# Patient Record
Sex: MALE | Race: WHITE | NOT HISPANIC OR LATINO | URBAN - METROPOLITAN AREA
[De-identification: names, ages, dates, MRNs, and addresses within clinical notes are randomized per-mention and may not be internally consistent; named-entity substitution may affect disease eponyms.]

---

## 2023-08-18 ENCOUNTER — TRANSFERRED RECORDS (OUTPATIENT)
Dept: HEALTH INFORMATION MANAGEMENT | Facility: CLINIC | Age: 73
End: 2023-08-18

## 2023-10-16 ENCOUNTER — TRANSFERRED RECORDS (OUTPATIENT)
Dept: HEALTH INFORMATION MANAGEMENT | Facility: CLINIC | Age: 73
End: 2023-10-16

## 2023-12-08 ENCOUNTER — MEDICAL CORRESPONDENCE (OUTPATIENT)
Dept: HEALTH INFORMATION MANAGEMENT | Facility: CLINIC | Age: 73
End: 2023-12-08

## 2023-12-15 ENCOUNTER — MEDICAL CORRESPONDENCE (OUTPATIENT)
Dept: HEALTH INFORMATION MANAGEMENT | Facility: CLINIC | Age: 73
End: 2023-12-15

## 2023-12-15 ENCOUNTER — REFERRAL (OUTPATIENT)
Dept: TRANSPLANT | Facility: CLINIC | Age: 73
End: 2023-12-15

## 2023-12-15 VITALS — HEIGHT: 72 IN | BODY MASS INDEX: 23.7 KG/M2 | WEIGHT: 175 LBS

## 2023-12-15 DIAGNOSIS — N18.6 END STAGE RENAL DISEASE (H): ICD-10-CM

## 2023-12-15 DIAGNOSIS — Z99.2 ESRD (END STAGE RENAL DISEASE) ON DIALYSIS (H): Primary | ICD-10-CM

## 2023-12-15 DIAGNOSIS — E78.5 HYPERLIPIDEMIA: ICD-10-CM

## 2023-12-15 DIAGNOSIS — Z85.828 HISTORY OF SKIN CANCER: ICD-10-CM

## 2023-12-15 DIAGNOSIS — Z87.891 HISTORY OF TOBACCO USE: ICD-10-CM

## 2023-12-15 DIAGNOSIS — Z76.82 ORGAN TRANSPLANT CANDIDATE: ICD-10-CM

## 2023-12-15 DIAGNOSIS — I10 ESSENTIAL HYPERTENSION: ICD-10-CM

## 2023-12-15 DIAGNOSIS — I25.10 CARDIOVASCULAR DISEASE: ICD-10-CM

## 2023-12-15 DIAGNOSIS — N18.6 ESRD (END STAGE RENAL DISEASE) ON DIALYSIS (H): Primary | ICD-10-CM

## 2023-12-15 DIAGNOSIS — Z99.2 HEMODIALYSIS PATIENT (H): ICD-10-CM

## 2023-12-15 DIAGNOSIS — Z01.818 PRE-TRANSPLANT EVALUATION FOR KIDNEY TRANSPLANT: ICD-10-CM

## 2023-12-15 DIAGNOSIS — E11.9 DIABETES MELLITUS, TYPE 2 (H): ICD-10-CM

## 2023-12-15 PROBLEM — I50.22 CHRONIC HFREF (HEART FAILURE WITH REDUCED EJECTION FRACTION) (H): Status: ACTIVE | Noted: 2023-05-24

## 2023-12-15 PROBLEM — N18.9 HYPERPHOSPHATEMIA DUE TO CHRONIC KIDNEY DISEASE: Status: ACTIVE | Noted: 2023-05-10

## 2023-12-15 PROBLEM — I21.4 NSTEMI (NON-ST ELEVATED MYOCARDIAL INFARCTION) (H): Status: ACTIVE | Noted: 2023-05-14

## 2023-12-15 PROBLEM — N40.1 BPH WITH OBSTRUCTION/LOWER URINARY TRACT SYMPTOMS: Status: ACTIVE | Noted: 2022-03-01

## 2023-12-15 PROBLEM — N13.8 BPH WITH OBSTRUCTION/LOWER URINARY TRACT SYMPTOMS: Status: ACTIVE | Noted: 2022-03-01

## 2023-12-15 PROBLEM — N25.81 HYPERPARATHYROIDISM, SECONDARY RENAL (H): Status: ACTIVE | Noted: 2023-05-10

## 2023-12-15 PROBLEM — Z79.4 TYPE 2 DIABETES MELLITUS WITH STAGE 4 CHRONIC KIDNEY DISEASE, WITH LONG-TERM CURRENT USE OF INSULIN (H): Status: ACTIVE | Noted: 2021-09-14

## 2023-12-15 PROBLEM — E83.39 HYPERPHOSPHATEMIA DUE TO CHRONIC KIDNEY DISEASE: Status: ACTIVE | Noted: 2023-05-10

## 2023-12-15 PROBLEM — N18.4 TYPE 2 DIABETES MELLITUS WITH STAGE 4 CHRONIC KIDNEY DISEASE, WITH LONG-TERM CURRENT USE OF INSULIN (H): Status: ACTIVE | Noted: 2021-09-14

## 2023-12-15 PROBLEM — E11.22 TYPE 2 DIABETES MELLITUS WITH STAGE 4 CHRONIC KIDNEY DISEASE, WITH LONG-TERM CURRENT USE OF INSULIN (H): Status: ACTIVE | Noted: 2021-09-14

## 2023-12-15 PROBLEM — Z95.1 S/P CABG X 4: Status: ACTIVE | Noted: 2023-05-23

## 2023-12-15 NOTE — LETTER
Elvin Brown May  [address] [facility]  [place] [address]                January 2, 2024    Cosme Oconnor,     It was a pleasure to speak with you over the phone today in preparation of your pre-kidney transplant evaluation. I am sending this email to review the pre-transplant information we covered. I will also put this same content in a letter and send it to your My Chart for your convenience.     A  from our Office will send your schedule in your My Chart for your pre-kidney transplant evaluation on 3/13/24     [facility]  [address] [place] [id number]  Please go to the third floor to check in by 7:30 am.      For parking options, please park in the open lot across the street from the front door of our Clinic.  Otherwise, enter the [facility] /arrival plaza from [facility] and attendants can assist you based on your needs.  parking is available for those with limited mobility Monday-Friday from 7:00 am to 5:00 pm. Please bring one to two family members or friends to your appointment day to help listen to the patient education and to help ask questions that are important to you. You can eat and drink normally on this day. There is a coffee shop on street level for you to purchase food at. Also, please take all your prescribed medications as ordered on this day. Upon completion of your appointments, I will compile the outcomes and have your results reviewed at the Transplant Team Selection Committee on 3/20/23. This is a medical review meeting only and so you will not be asked to attend. I will call you within a few days after this meeting to inform you of the outcomes and to assist in making arrangements for completion of your evaluation. I will also send you a summary letter after our telephone conversation.     You will receive an email from our Transplant Office which contains a Receipt of Information consent  and patient educational materials. Please read/ electronically sign the Receipt of Information consent as well as read the educational materials prior to your evaluation appointments on 3/13/24     Please complete your pre kidney transplant education on the transplant education   [email or web address]           Please complete viewing of these videos prior to your evaluation appointments as this will give you a good knowledge base to then speak with the providers.      Additional transplant resources are as follows:  www.unos.org. UNOS, or United Network of Organ Sharing, is the national organization in our country that maintains all of the organ wait lists as well as is responsible for the rules and regulations for organ allocation. I recommend looking at the Transplant Living section as this area has content created for patients.     www.srtr.org SRTR, or the Scientific Registry for Transplant Recipients is a national data base that all Transplant Centers report their success and failure rate to for all organ types twice per year. The results are public knowledge and do provide a good perspective of organ transplant.     If the transplant providers tell you at your appointments that you should start to have live donors register with our Program to initiate their evaluations, please provide this registration website: [email or web address]   The donor will receive a detailed email response back with information and next steps specific to their situation. Donors can also call our Office and ask to speak with a live donor coordinator in the event of questions at [phone].     Please let me know of any questions or concerns,  Laquita Cyr RN  Pre-Kidney/Pancreas Transplant Coordinator  Email: [email or web address]  Direct Phone Number: [phone]

## 2023-12-15 NOTE — LETTER
Elvin Brown May  [address] [place] [id number]          Dear Elvin,    Thank you for your interest in the Transplant Center at [facility]. We look forward to being a part of your care team and assisting you through the transplant process.    As we discussed, your transplant coordinator is Laqutia Cyr (Kidney).  You may call your coordinator at any time with questions or concerns.  Your first scheduled call will be on 1/2/20204 between 1:00pm-3:00pm. If this needs to change, call [phone].    Please complete the following.    Fill out and return the enclosed forms  Authorization for Electronic Communication  Authorization to Discuss Protected Health Information  Authorization for Release of Protected Health Information    Sign up for:  [employer], access to your electronic medical record (see enclosed pamphlet)  [facility], a transplant education website                                                        My Transplant Place     You can use these tools to learn more about your transplant, communicate with your care team, and track your medical details      Sincerely,      Solid Organ Transplant  [facility]    cc: Referring Physician Dialysis Unit PCP

## 2023-12-15 NOTE — TELEPHONE ENCOUNTER
PCP: Clarisse Fink   Referring Organization: [facility] [employer]  Referring Provider: Dr. 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 No  Is patient diabetic? Yes  Is patient on insulin? Yes  Was patient offered a pancreas transplant referral? No    Is patient in a group home/assisted living? No  Does patient have a guardian? No     Referral intake process completed.  Patient is aware that after financial approval is received, medical records will be requested.   Patient confirmed for a callback from transplant coordinator on 1/2/24. (within 2 weeks)  Tentative evaluation date 3/13/24 slot 1 (within 4 weeks) if appointment is virtual, does patient have capabilities of setting this up? no    Confirmed coordinator will discuss evaluation process in more detail at the time of their call.   Patient is aware of the need to arrange age appropriate cancer screening, vaccinations, and dental care.  Reminded patient to complete questionnaire, complete medical records release, and review packet prior to evaluation visit .  Assessed patient for special needs (ie-wheelchair, assistance, guardian, and ):  yes   Patient instructed to call [phone] with questions.     Patient gave verbal consent during intake call to obtain medical records and documents outside of [facility]:  Yes

## 2023-12-15 NOTE — LETTER
Elvin Brown May  [address] [facility] [address]                December 18, 2023                                        MEDICAL RECORDS REQUEST       [facility] Kidney transplant team is requesting medical records from Dermatology office for patients referred to the Kidney Transplant Program                [facility]ility[facility]     Records Needed to Process Intake of Patient:     Original History and Physical   Provider progress notes (last 2 reports on file)  Lab Results (most recent on file)        Please fax all paper records to [phone] within 3-5 business days.      Please send all scans/slides to:    [facility]  Solid Organ Transplant Office  [address] [place] [address] [address] [place] [id number]    Please call our office at [phone] if you have any questions or concerns.

## 2024-01-02 NOTE — TELEPHONE ENCOUNTER
"Reviewed pt's chart for pre-kidney transplant evaluation planning. Coordinator first call on 1/2/24. PKE STD on 3/13/24.      services required no. Is pt able to attend virtual education class? 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  Pt is on home PD dialysis,  started on 5/29/23. Pt is a type II diabetic, on 40-45 units of insulin/day. Reports last A1C was in the \"low 8's.\"     Health hx: diabetic nephropathy, bladder wall thickening, diabetic retinopathy, Pancreatitis (1990), dyslipidemia, hx of adenomatous colonic polyps, BPH with obstruction, acute diastolic HF, hyperparathyroidism, anemia.  Heart hx: Had a CABG x4 5/16/23 at [facility] and left atrial appendage ligation. On DAPT therapy for 1 year post surgery per cardiology, also has history of hypertension.  Lung hx: none. Surgical hx: CABG x4 in 5/23, cholecystectomy and pancreatic cyst removal in 1990.  Pt is currently smoker but hopes to be totally done by the end of this week, has a 50 year history on and off of smoking cigarettes and cigars, does not consume alcohol, and no recreational drugs. Does not have current infection, no non-healing wounds, or active cancer. 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 (See path report in media tab)    Health maintenance items: BMI 24.  Colonoscopy done 4/23 and recommended 5 year follow up.  Dental: due, needs a tooth pulled.  Vaccinations, currently updating hep B series. Pt is independent w/ ADLs, is limited to walking no more than a block due to leg weakness/pain.  Pt lives in [place], [place]  w/ spouse Anne-Marie.  good support following transplant. Pt does not have living donors.     Imaging Available CT Abdomen Pelvis from 1/2/24. Requested images to pushed to pacs     Contacted patient and introduced myself as their Transplant Coordinator, also introduced the role of the Transplant Coordinator in the transplant process.  Explained the purpose of " this call including reviewing next steps and answering questions.      Confirmed Referring Provider, Dr. Deepak Jacobs; Dialysis Center, [facility] home PD; and Primary Care Physician, Clarisse Fink MD. Explained to the patient of the importance of continued communication with referring providers and primary care physicians.      Reviewed components of transplant evaluation process including necessary appointments, tests, and procedures.  Instructed pt to bring 1-2 people with them to eval and to eat and drink and normally on eval day    Answered questions for patient regarding evaluation, provided my name and contact information and requested they call/message with any additional questions or concerns.  Informed patient they will receive a letter with information discussed in referral call. Determined that patient would like information regarding transplant by:       Mail, Email, and/or Phone Call.  (Patient does not have internet for [employer])     Informed pt about transplant educational website, asked to watch pre-kidney or sign up for education class prior to evaluation. Link for educational videos were provided.  Additional informational web sites about transplant were discussed. Links provided to www.unos.org and www.srtr.org in letter sent to patient.  Pt expressed good understanding of all and were in good agreement with the plan. Patient does not have internet but wife goes to the [employer] to check her email, so will send education packet to her and the dialysis unit. Will schedule education phone call for 3/7/24 at 11am.     Confirmed STD 3/13/24. Informed pt they will hear from scheduling to arrange appointment times for evaluation day. As well as, receive an email from our Office prior to eval with a Receipt of Info and educational materials - instructed to read materials and sign consent prior to eval. Smartset orders entered.

## 2024-03-06 ENCOUNTER — TELEPHONE (OUTPATIENT)
Dept: TRANSPLANT | Facility: CLINIC | Age: 74
End: 2024-03-06
Payer: COMMERCIAL

## 2024-03-06 NOTE — TELEPHONE ENCOUNTER
I/Geetha Friedman RN BSN called Elvin Guerrero and DWAYNE on his land line phone to remind him of the all day evaluation on Wednesday, 3/13/2024.   I gave him the address of [facility] to [place] at 7:30 am and expect to be in clinic all day until 4pm.   I urged Mr. Guerrero to bring a support person with him to listen to the providers in the evaluations.   Pt rcommended to bring money for parking and for lunch.   The full evaluation will be an ALL day event to arrive at 7:30 and CXR, EKG and labs, Echo are from 1-4pm.   I told him masks are not mandatory but to protect himself in the common areas, a Mask is suggustion. Providers may wear masks in the private room in .     Isacc Cyr RN

## 2024-03-12 LAB
ABO/RH(D): ABNORMAL
ANTIBODY SCREEN: POSITIVE
SPECIMEN EXPIRATION DATE: ABNORMAL
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
## 2024-03-13 ENCOUNTER — ALLIED HEALTH/NURSE VISIT (OUTPATIENT)
Dept: TRANSPLANT | Facility: CLINIC | Age: 74
End: 2024-03-13
Attending: NURSE PRACTITIONER
Payer: COMMERCIAL

## 2024-03-13 ENCOUNTER — ANCILLARY PROCEDURE (OUTPATIENT)
Dept: GENERAL RADIOLOGY | Facility: CLINIC | Age: 74
End: 2024-03-13
Attending: NURSE PRACTITIONER
Payer: COMMERCIAL

## 2024-03-13 ENCOUNTER — EVALUATION (OUTPATIENT)
Dept: TRANSPLANT | Facility: CLINIC | Age: 74
End: 2024-03-13

## 2024-03-13 ENCOUNTER — ANCILLARY PROCEDURE (OUTPATIENT)
Dept: CARDIOLOGY | Facility: CLINIC | Age: 74
End: 2024-03-13
Attending: NURSE PRACTITIONER
Payer: COMMERCIAL

## 2024-03-13 ENCOUNTER — LAB (OUTPATIENT)
Dept: LAB | Facility: CLINIC | Age: 74
End: 2024-03-13
Attending: NURSE PRACTITIONER
Payer: COMMERCIAL

## 2024-03-13 VITALS
DIASTOLIC BLOOD PRESSURE: 72 MMHG | HEIGHT: 71 IN | WEIGHT: 192.4 LBS | HEART RATE: 55 BPM | BODY MASS INDEX: 26.94 KG/M2 | OXYGEN SATURATION: 96 % | SYSTOLIC BLOOD PRESSURE: 137 MMHG

## 2024-03-13 DIAGNOSIS — Z99.2 ESRD (END STAGE RENAL DISEASE) ON DIALYSIS (H): ICD-10-CM

## 2024-03-13 DIAGNOSIS — N18.6 ESRD (END STAGE RENAL DISEASE) (H): ICD-10-CM

## 2024-03-13 DIAGNOSIS — Z87.891 HISTORY OF TOBACCO USE: ICD-10-CM

## 2024-03-13 DIAGNOSIS — E78.5 HYPERLIPIDEMIA: ICD-10-CM

## 2024-03-13 DIAGNOSIS — I25.10 CARDIOVASCULAR DISEASE: ICD-10-CM

## 2024-03-13 DIAGNOSIS — N18.6 ESRD (END STAGE RENAL DISEASE) (H): Primary | ICD-10-CM

## 2024-03-13 DIAGNOSIS — Z01.818 PRE-TRANSPLANT EVALUATION FOR KIDNEY TRANSPLANT: ICD-10-CM

## 2024-03-13 DIAGNOSIS — Z99.2 HEMODIALYSIS PATIENT (H): ICD-10-CM

## 2024-03-13 DIAGNOSIS — Z79.4 TYPE 2 DIABETES MELLITUS WITH STAGE 4 CHRONIC KIDNEY DISEASE, WITH LONG-TERM CURRENT USE OF INSULIN (H): ICD-10-CM

## 2024-03-13 DIAGNOSIS — Z76.82 ORGAN TRANSPLANT CANDIDATE: ICD-10-CM

## 2024-03-13 DIAGNOSIS — Z85.828 HISTORY OF SKIN CANCER: ICD-10-CM

## 2024-03-13 DIAGNOSIS — Z95.1 S/P CABG X 4: ICD-10-CM

## 2024-03-13 DIAGNOSIS — E11.9 DIABETES MELLITUS, TYPE 2 (H): ICD-10-CM

## 2024-03-13 DIAGNOSIS — I10 ESSENTIAL HYPERTENSION: ICD-10-CM

## 2024-03-13 DIAGNOSIS — N18.4 TYPE 2 DIABETES MELLITUS WITH STAGE 4 CHRONIC KIDNEY DISEASE, WITH LONG-TERM CURRENT USE OF INSULIN (H): ICD-10-CM

## 2024-03-13 DIAGNOSIS — I21.4 NSTEMI (NON-ST ELEVATED MYOCARDIAL INFARCTION) (H): ICD-10-CM

## 2024-03-13 DIAGNOSIS — Z01.818 PRE-TRANSPLANT EVALUATION FOR KIDNEY TRANSPLANT: Primary | ICD-10-CM

## 2024-03-13 DIAGNOSIS — N18.6 END STAGE RENAL DISEASE (H): ICD-10-CM

## 2024-03-13 DIAGNOSIS — Z99.2 CKD (CHRONIC KIDNEY DISEASE) REQUIRING CHRONIC DIALYSIS (H): ICD-10-CM

## 2024-03-13 DIAGNOSIS — N18.6 CKD (CHRONIC KIDNEY DISEASE) REQUIRING CHRONIC DIALYSIS (H): ICD-10-CM

## 2024-03-13 DIAGNOSIS — N18.6 ESRD (END STAGE RENAL DISEASE) ON DIALYSIS (H): ICD-10-CM

## 2024-03-13 DIAGNOSIS — E11.22 TYPE 2 DIABETES MELLITUS WITH STAGE 4 CHRONIC KIDNEY DISEASE, WITH LONG-TERM CURRENT USE OF INSULIN (H): ICD-10-CM

## 2024-03-13 DIAGNOSIS — Z99.2 HEMODIALYSIS PATIENT (H): Primary | ICD-10-CM

## 2024-03-13 DIAGNOSIS — Z76.82 ORGAN TRANSPLANT CANDIDATE: Primary | ICD-10-CM

## 2024-03-13 DIAGNOSIS — I50.22 CHRONIC HFREF (HEART FAILURE WITH REDUCED EJECTION FRACTION) (H): ICD-10-CM

## 2024-03-13 LAB
A1 AB TITR SERPL: 4 {TITER}
A1 AB TITR SERPL: 4 {TITER}
ABO/RH(D): NORMAL
ALBUMIN SERPL BCG-MCNC: 4 G/DL (ref 3.5–5.2)
ALBUMIN UR-MCNC: 200 MG/DL
ALP SERPL-CCNC: 120 U/L (ref 40–150)
ALT SERPL W P-5'-P-CCNC: 21 U/L (ref 0–70)
ANION GAP SERPL CALCULATED.3IONS-SCNC: 12 MMOL/L (ref 7–15)
ANTIBODY ID: NORMAL
ANTIBODY TITER IGM SCREEN: POSITIVE
APPEARANCE UR: CLEAR
APTT PPP: 29 SECONDS (ref 22–38)
AST SERPL W P-5'-P-CCNC: 21 U/L (ref 0–45)
B IGG TITR SERPL: 64 {TITER}
B IGM TITR SERPL: 4 {TITER}
BASOPHILS # BLD AUTO: 0.1 10E3/UL (ref 0–0.2)
BASOPHILS NFR BLD AUTO: 1 %
BILIRUB SERPL-MCNC: 0.3 MG/DL
BILIRUB UR QL STRIP: NEGATIVE
BUN SERPL-MCNC: 54.8 MG/DL (ref 8–23)
CALCIUM SERPL-MCNC: 9 MG/DL (ref 8.8–10.2)
CHLORIDE SERPL-SCNC: 100 MMOL/L (ref 98–107)
COLOR UR AUTO: ABNORMAL
CREAT SERPL-MCNC: 5.68 MG/DL (ref 0.67–1.17)
DEPRECATED HCO3 PLAS-SCNC: 29 MMOL/L (ref 22–29)
EGFRCR SERPLBLD CKD-EPI 2021: 10 ML/MIN/1.73M2
EOSINOPHIL # BLD AUTO: 0.5 10E3/UL (ref 0–0.7)
EOSINOPHIL NFR BLD AUTO: 7 %
ERYTHROCYTE [DISTWIDTH] IN BLOOD BY AUTOMATED COUNT: 13.1 % (ref 10–15)
FACTOR 2 INTERPRETATION: NORMAL
FACTOR V INTERPRETATION: NORMAL
GLUCOSE SERPL-MCNC: 74 MG/DL (ref 70–99)
GLUCOSE UR STRIP-MCNC: 200 MG/DL
HBV CORE AB SERPL QL IA: NONREACTIVE
HBV SURFACE AB SERPL IA-ACNC: 32.5 M[IU]/ML
HBV SURFACE AB SERPL IA-ACNC: REACTIVE M[IU]/ML
HCT VFR BLD AUTO: 39.1 % (ref 40–53)
HCV AB SERPL QL IA: NONREACTIVE
HGB BLD-MCNC: 12.8 G/DL (ref 13.3–17.7)
HGB UR QL STRIP: NEGATIVE
HIV 1+2 AB+HIV1 P24 AG SERPL QL IA: NONREACTIVE
HYALINE CASTS: 2 /LPF
IMM GRANULOCYTES # BLD: 0 10E3/UL
IMM GRANULOCYTES NFR BLD: 0 %
INR PPP: 1.07 (ref 0.85–1.15)
KETONES UR STRIP-MCNC: NEGATIVE MG/DL
LAB DIRECTOR COMMENTS: NORMAL
LAB DIRECTOR DISCLAIMER: NORMAL
LAB DIRECTOR INTERPRETATION: NORMAL
LAB DIRECTOR METHODOLOGY: NORMAL
LAB DIRECTOR RESULTS: NORMAL
LEUKOCYTE ESTERASE UR QL STRIP: NEGATIVE
LVEF ECHO: NORMAL
LYMPHOCYTES # BLD AUTO: 1.6 10E3/UL (ref 0.8–5.3)
LYMPHOCYTES NFR BLD AUTO: 20 %
MCH RBC QN AUTO: 30.8 PG (ref 26.5–33)
MCHC RBC AUTO-ENTMCNC: 32.7 G/DL (ref 31.5–36.5)
MCV RBC AUTO: 94 FL (ref 78–100)
MONOCYTES # BLD AUTO: 0.8 10E3/UL (ref 0–1.3)
MONOCYTES NFR BLD AUTO: 11 %
MUCOUS THREADS #/AREA URNS LPF: PRESENT /LPF
NEUTROPHILS # BLD AUTO: 4.7 10E3/UL (ref 1.6–8.3)
NEUTROPHILS NFR BLD AUTO: 61 %
NITRATE UR QL: NEGATIVE
NRBC # BLD AUTO: 0 10E3/UL
NRBC BLD AUTO-RTO: 0 /100
PH UR STRIP: 6 [PH] (ref 5–7)
PLATELET # BLD AUTO: 244 10E3/UL (ref 150–450)
POTASSIUM SERPL-SCNC: 4 MMOL/L (ref 3.4–5.3)
PROT SERPL-MCNC: 7.2 G/DL (ref 6.4–8.3)
RBC # BLD AUTO: 4.15 10E6/UL (ref 4.4–5.9)
RBC URINE: 1 /HPF
SODIUM SERPL-SCNC: 141 MMOL/L (ref 135–145)
SP GR UR STRIP: 1.02 (ref 1–1.03)
SPECIMEN DESCRIPTION: NORMAL
SPECIMEN EXPIRATION DATE: NORMAL
SQUAMOUS EPITHELIAL: <1 /HPF
T PALLIDUM AB SER QL: NONREACTIVE
UROBILINOGEN UR STRIP-MCNC: NORMAL MG/DL
WBC # BLD AUTO: 7.7 10E3/UL (ref 4–11)
WBC URINE: 2 /HPF

## 2024-03-13 PROCEDURE — 99205 OFFICE O/P NEW HI 60 MIN: CPT | Performed by: INTERNAL MEDICINE

## 2024-03-13 PROCEDURE — 80053 COMPREHEN METABOLIC PANEL: CPT | Performed by: PATHOLOGY

## 2024-03-13 PROCEDURE — 86832 HLA CLASS I HIGH DEFIN QUAL: CPT | Performed by: INTERNAL MEDICINE

## 2024-03-13 PROCEDURE — 93000 ELECTROCARDIOGRAM COMPLETE: CPT | Performed by: INTERNAL MEDICINE

## 2024-03-13 PROCEDURE — 86886 COOMBS TEST INDIRECT TITER: CPT | Performed by: INTERNAL MEDICINE

## 2024-03-13 PROCEDURE — 85670 THROMBIN TIME PLASMA: CPT | Performed by: INTERNAL MEDICINE

## 2024-03-13 PROCEDURE — 93306 TTE W/DOPPLER COMPLETE: CPT | Performed by: INTERNAL MEDICINE

## 2024-03-13 PROCEDURE — 86665 EPSTEIN-BARR CAPSID VCA: CPT | Performed by: INTERNAL MEDICINE

## 2024-03-13 PROCEDURE — 99000 SPECIMEN HANDLING OFFICE-LAB: CPT | Performed by: PATHOLOGY

## 2024-03-13 PROCEDURE — 86780 TREPONEMA PALLIDUM: CPT | Performed by: INTERNAL MEDICINE

## 2024-03-13 PROCEDURE — 81378 HLA I & II TYPING HR: CPT | Performed by: INTERNAL MEDICINE

## 2024-03-13 PROCEDURE — 87340 HEPATITIS B SURFACE AG IA: CPT | Performed by: INTERNAL MEDICINE

## 2024-03-13 PROCEDURE — 86833 HLA CLASS II HIGH DEFIN QUAL: CPT | Performed by: INTERNAL MEDICINE

## 2024-03-13 PROCEDURE — 85730 THROMBOPLASTIN TIME PARTIAL: CPT | Performed by: PATHOLOGY

## 2024-03-13 PROCEDURE — 85610 PROTHROMBIN TIME: CPT | Performed by: PATHOLOGY

## 2024-03-13 PROCEDURE — 81241 F5 GENE: CPT | Performed by: INTERNAL MEDICINE

## 2024-03-13 PROCEDURE — 99204 OFFICE O/P NEW MOD 45 MIN: CPT | Performed by: SURGERY

## 2024-03-13 PROCEDURE — 86870 RBC ANTIBODY IDENTIFICATION: CPT | Performed by: INTERNAL MEDICINE

## 2024-03-13 PROCEDURE — 86787 VARICELLA-ZOSTER ANTIBODY: CPT | Performed by: INTERNAL MEDICINE

## 2024-03-13 PROCEDURE — 86850 RBC ANTIBODY SCREEN: CPT | Performed by: INTERNAL MEDICINE

## 2024-03-13 PROCEDURE — 71046 X-RAY EXAM CHEST 2 VIEWS: CPT | Performed by: RADIOLOGY

## 2024-03-13 PROCEDURE — 86481 TB AG RESPONSE T-CELL SUSP: CPT | Performed by: INTERNAL MEDICINE

## 2024-03-13 PROCEDURE — 86644 CMV ANTIBODY: CPT | Performed by: INTERNAL MEDICINE

## 2024-03-13 PROCEDURE — 85390 FIBRINOLYSINS SCREEN I&R: CPT | Mod: 26 | Performed by: PATHOLOGY

## 2024-03-13 PROCEDURE — 36415 COLL VENOUS BLD VENIPUNCTURE: CPT | Performed by: PATHOLOGY

## 2024-03-13 PROCEDURE — 81001 URINALYSIS AUTO W/SCOPE: CPT | Performed by: PATHOLOGY

## 2024-03-13 PROCEDURE — G0452 MOLECULAR PATHOLOGY INTERPR: HCPCS | Mod: 26 | Performed by: STUDENT IN AN ORGANIZED HEALTH CARE EDUCATION/TRAINING PROGRAM

## 2024-03-13 PROCEDURE — G0463 HOSPITAL OUTPT CLINIC VISIT: HCPCS | Performed by: SURGERY

## 2024-03-13 PROCEDURE — 85730 THROMBOPLASTIN TIME PARTIAL: CPT | Performed by: INTERNAL MEDICINE

## 2024-03-13 PROCEDURE — 86905 BLOOD TYPING RBC ANTIGENS: CPT | Performed by: INTERNAL MEDICINE

## 2024-03-13 PROCEDURE — 86147 CARDIOLIPIN ANTIBODY EA IG: CPT | Performed by: INTERNAL MEDICINE

## 2024-03-13 PROCEDURE — 86803 HEPATITIS C AB TEST: CPT | Performed by: INTERNAL MEDICINE

## 2024-03-13 PROCEDURE — 86900 BLOOD TYPING SEROLOGIC ABO: CPT | Performed by: INTERNAL MEDICINE

## 2024-03-13 PROCEDURE — 86704 HEP B CORE ANTIBODY TOTAL: CPT | Performed by: INTERNAL MEDICINE

## 2024-03-13 PROCEDURE — 99207 PR NO CHARGE COORDINATED CARE PS: CPT

## 2024-03-13 PROCEDURE — 85025 COMPLETE CBC W/AUTO DIFF WBC: CPT | Performed by: PATHOLOGY

## 2024-03-13 PROCEDURE — 86706 HEP B SURFACE ANTIBODY: CPT | Performed by: INTERNAL MEDICINE

## 2024-03-13 RX ORDER — CLOPIDOGREL BISULFATE 75 MG/1
75 TABLET ORAL DAILY
COMMUNITY
Start: 2023-05-28

## 2024-03-13 RX ORDER — ATORVASTATIN CALCIUM 80 MG/1
80 TABLET, FILM COATED ORAL
COMMUNITY
Start: 2023-05-28

## 2024-03-13 RX ORDER — NICOTINE POLACRILEX 4 MG
15 LOZENGE BUCCAL
COMMUNITY
Start: 2023-12-18

## 2024-03-13 RX ORDER — ASCORBIC ACID 100 MG
1 TABLET,CHEWABLE ORAL EVERY MORNING
COMMUNITY
Start: 2023-05-28

## 2024-03-13 RX ORDER — FUROSEMIDE 80 MG
80 TABLET ORAL DAILY
COMMUNITY
Start: 2023-05-28

## 2024-03-13 RX ORDER — LISINOPRIL 30 MG/1
30 TABLET ORAL AT BEDTIME
COMMUNITY
Start: 2024-01-30

## 2024-03-13 RX ORDER — GENTAMICIN SULFATE 1 MG/G
1 CREAM TOPICAL
COMMUNITY
Start: 2023-09-08

## 2024-03-13 RX ORDER — INSULIN LISPRO 100 [IU]/ML
INJECTION, SOLUTION INTRAVENOUS; SUBCUTANEOUS
COMMUNITY

## 2024-03-13 RX ORDER — AMLODIPINE BESYLATE 10 MG/1
10 TABLET ORAL DAILY
COMMUNITY
Start: 2023-12-26

## 2024-03-13 RX ORDER — NITROGLYCERIN 0.4 MG/1
0.4 TABLET SUBLINGUAL
COMMUNITY
Start: 2023-06-02

## 2024-03-13 RX ORDER — ASPIRIN 81 MG/1
81 TABLET ORAL DAILY
COMMUNITY

## 2024-03-13 RX ORDER — CARVEDILOL 25 MG/1
25 TABLET ORAL
COMMUNITY
Start: 2023-06-02

## 2024-03-13 RX ORDER — CALCIUM ACETATE 667 MG/1
667 TABLET ORAL
COMMUNITY
Start: 2023-07-05

## 2024-03-13 NOTE — PROGRESS NOTES
Transplant Surgery Consult Note    Medical record number: [id number]  YOB: 1950,   Consult requested by Dr. Jacobs for evaluation of kidney transplant candidacy.    Assessment and Recommendations: Mr. Guerrero is a fair candidate for transplantation and has a fair understanding of the risks and benefits of this approach to management of renal failure and diabetes. The following issues should be addressed prior to transplant:     Mr. Guerrero has End stage renal failure due to diabetes mellitus type 2 whose condition is not expected to resolve, is expected to progress, and is expected to continue to develop related comorbid conditions.  Cardiology consult for cardiac risk stratification to be ordered: Yes  CT abdomen and pelvis without contrast to be ordered for assessment of vascular targets: No  Transplant listing labs ordered to include HLA, ABOx2, Cr, etc.  Dietician consult ordered: Yes  Social work consult ordered: Yes  Imaging reports reviewed:  CT from 2024  IMPRESSION:   1. No hydronephrosis or obstructing calculus.   2. No noncontrast CT findings to explain reported symptoms; however, evaluation   is limited without intravenous contrast.   3. Status post cholecystectomy.   4. Peritoneal dialysis catheter in place with low-attenuation ascites   throughout the abdomen.   5. Otherwise as above.  Radiology images reviewed: Please obtain 2024 CT images for review  Recipient suitable to move forward with work up of living donors:  No  Clarify cancer wait time  Smoking cessation  obtain 2024 CT images for review  May need screening chest CT for smoking history.  Discussed risks of skin cancer      The majority of our visit was spent in counselling, discussing the medical and surgical risks of living or  donor kidney and pancreas transplantation, either in a simultaneous or sequential fashion. I contrasted approximate wait time for SPK vs living vs  donor kidneys from normal (0-85%) or  higher (%) kidney donor profile index (KDPI) donors and their associated outcomes. I would recommend this individual to consider kidneys from high KDPI donors. The reason for this decision is best summarized as: decreased dialysis related morbidity/mortality, accepting lower kidney graft survival rates.  Access to transplant will be impacted by living donor availability and overall candidacy for SPK, as well as the influence of blood type and degree of sensitization. We discussed advantages of preemptive transplant as well as living donor kidney transplant, and graft and patient survival outcomes associated with these options. Potential surgical complications of kidney and pancreas transplantation include bleeding, clotting, infection, wound complications, anastomotic failure and other issues such as cardiac complications, pneumonia, deep venous thrombosis, pulmonary embolism, post transplant diabetes and death. We discussed the need for protocol biopsy of the kidney and the possible need for a ureteral stent (and subsequent removal). We discussed benefits and risks associated with different approaches to exocrine drainage of pancreatic secretions. We also discussed differences in the average length of stay, recovery process, and posttransplant lab and monitoring protocol. We discussed the risk of graft rejection and recurrent diabetic nephropathy in the setting of poor glycemic control. I emphasized the need for strict immunosuppression adherence and the potential for complications of immunosuppression such as skin cancer or lymphoma, as well as a very low but not zero risk of donor-derived disease transmission risks (infection, cancer). Mr. Guerrero asked good questions and the patient's candidacy will be reviewed at our Multidisciplinary Selection Committee. Thank you for the opportunity to participate in Mr. Guerrero's care.    Total time: 60 minutes        Luz Ca MD FACS   of  Surgery  Director, Living Kidney Donor Program.  ---------------------------------------------------------------------------------------------------    HPI: Mr. Guerrero has End stage renal failure due to diabetes mellitus type 2. The patient has had diabetes for 30+ years. Management is by Humalog units  15u per day via ssi. Levemir 15u BID. The patient usually checks his blood sugar numerous times/day.  Daily blood glucoses range typically from 200 to 300.  Hypoglyemic unawareness is not an issue.  The diabetes is controlled.    Complications of diabetes include:    Retinopathy:  Yes   Neuropathy: Yes   Gastroparesis:  No    The patient is on dialysis.    Has potential kidney donors:  Yes .  Interested in participation in paired exchange if a donor is willing: Doesn't know     The patient has the following pertinent history:       No    Yes  Dialysis:    []      [x] via:    PD   Blood Transfusion                  [x]      []  Number of units:   Most recently:  Pregnancy:    [x]      [] Number:       Previous Transplant:  [x]      [] Details:    Cancer    []      [x] Comment: SCC 2.9cm 8/2023  Kidney stones   []      [x] Comment:   1980   Recurrent infections  [x]      []  Type:                  Bladder dysfunction  [x]      [] Cause:    Claudication   [x]      [] Distance:    Previous Amputation  [x]      [] Cause:     Chronic anticoagulation  []      [x] Indication: Plavix till 5/2024  Caodaism  [x]      []     No past medical history on file.  No past surgical history on file.  No family history on file.  Social History     Socioeconomic History     Marital status:      Spouse name: Not on file     Number of children: Not on file     Years of education: Not on file     Highest education level: Not on file   Occupational History     Not on file   Tobacco Use     Smoking status: Every Day     Types: Cigars     Smokeless tobacco: Never   Substance and Sexual Activity     Alcohol use: Yes     Comment:  Rare     Drug use: Never     Sexual activity: Not on file   Other Topics Concern     Parent/sibling w/ CABG, MI or angioplasty before 65F 55M? No   Social History Narrative     Not on file     Social Determinants of Health     Financial Resource Strain: Not on file   Food Insecurity: Not on file   Transportation Needs: Not on file   Physical Activity: Not on file   Stress: Not on file   Social Connections: Not on file   Interpersonal Safety: Not on file   Housing Stability: Not on file       ROS:   CONSTITUTIONAL:  No fevers or chills  EYES: negative for icterus  ENT:  negative for hearing loss, tinnitus and sore throat  RESPIRATORY:  negative for cough, sputum, dyspnea  CARDIOVASCULAR:  negative for chest pain Fatigue  GASTROINTESTINAL:  negative for nausea, vomiting, diarrhea or constipation  GENITOURINARY:  negative for incontinence, dysuria, bladder emptying problems  HEME:  No easy bruising  INTEGUMENT:  negative for rash and pruritus  NEURO:  Negative for headache, seizure disorder    Allergies:   Allergies   Allergen Reactions     Iodinated Contrast Media Other (See Comments) and Hives       Medications:  Prescription Medications as of 3/13/2024         Rx Number Disp Refills Start End Last Dispensed Date Next Fill Date Owning Pharmacy    amLODIPine (NORVASC) 10 MG tablet  -- -- 12/26/2023 --       Sig: Take 10 mg by mouth daily    Class: Historical    Route: Oral    aspirin 81 MG EC tablet  -- --  --       Sig: Take 81 mg by mouth daily    Class: Historical    Route: Oral    atorvastatin (LIPITOR) 80 MG tablet  -- -- 5/28/2023 --       Sig: Take 80 mg by mouth    Class: Historical    Route: Oral    calcium acetate (CALPHRON) 667 MG TABS tablet  -- -- 7/5/2023 --       Sig: Take 667 mg by mouth    Class: Historical    Route: Oral    carvedilol (COREG) 25 MG tablet  -- -- 6/2/2023 --       Sig: Take 25 mg by mouth    Class: Historical    Route: Oral    clopidogrel (PLAVIX) 75 MG tablet  -- -- 5/28/2023 --     "   Sig: Take 75 mg by mouth daily    Class: Historical    Route: Oral    furosemide (LASIX) 80 MG tablet  -- -- 5/28/2023 --       Sig: Take 80 mg by mouth daily    Class: Historical    Route: Oral    gentamicin (GARAMYCIN) 0.1 % external cream  -- -- 9/8/2023 --       Sig: Apply 1 Application topically    Class: Historical    Route: Topical    glucose 40 % (400 mg/mL) gel  -- -- 12/18/2023 --       Sig: Take 15 g by mouth    Class: Historical    Route: Oral    insulin detemir (LEVEMIR PEN) 100 UNIT/ML pen  -- -- 5/28/2023 --       Sig: 10 units two times a day    Class: Historical    insulin lispro (HUMALOG KWIKPEN) 100 UNIT/ML (1 unit dial) KWIKPEN  -- --  --       Sig: inject 5 units UNDER THE SKIN TWICE DAILY BEFORE meals plus sliding scale correction of 1 unit FOR every 50 points above 150    Class: Historical    lisinopril (ZESTRIL) 30 MG tablet  -- -- 1/30/2024 --       Sig: Take 30 mg by mouth at bedtime    Class: Historical    Route: Oral    Multiple Vitamin (QUINTABS) TABS  -- -- 5/28/2023 --       Sig: Take 1 tablet by mouth every morning    Class: Historical    Route: Oral    nitroGLYcerin (NITROSTAT) 0.4 MG sublingual tablet  -- -- 6/2/2023 --       Sig: Place 0.4 mg under the tongue    Class: Historical    Route: Sublingual            Exam:   Pulse:  [55] 55  BP: (137)/(72) 137/72  SpO2:  [96 %] 96 %  Appearance: in no apparent distress.   Skin: normal  Head and Neck: Normal, no rashes or jaundice  Respiratory: easy respirations, no audible wheezing.  Cardiovascular: RRR  Abdomen: rounded, protuberant, and distended, Surgical scars consistent with history and distended from PD dwell. Has PD catheter exiting RLQ. Has upper midline incision from pancreatic pseudocyst surgery   Extremities: femoral 1+/1+, Edema, none  Neuro: without deficit     Diagnostics:   No results found for this or any previous visit (from the past 672 hour(s)).  No results found for: \"CPRA\"   "

## 2024-03-13 NOTE — PROGRESS NOTES
"Kidney Transplant Referral - 12/15/2023  Elvin Guerrero attended the pre-transplant patient  EVALUATION on 3/13/2024 with spouse Anne-Marie.  The [facility] website pre-transplant modules were viewed;   Content reviewed:  Living Donation and how to access that program: daughter did volunteer to be a kidney donor, Mr Guerrero concerned about accept her as a live kidney donor.    Paired exchange  Kidney Donor Profile Index (KDPI)Mr. Guerrero signed the KDPI form Willing to accept KDPI >85%.  Form to be sent to [employer] to be scanned and copy to  Laquita Cyr RN   Waiting list issues (right to decline without penalty, high PHS risk donors, what to expect when called with an offer)  Hospital experience,  length of stay , need to stay locally post-discharge (2-4 weeks)  Surgical options (with pictures)                           Post-surgery lifting and driving restrictions  Post-transplant routines, frequency of lab work and clinic visits  Need to stay locally post-discharge (2-4 weeks)  Role of Transplant Coordinator  (Pre, Wait list, Post)  Participants were informed of the benefits of transplant as well as potential risks such as infection, cancer, and death.  The need for total adherence with immunosuppression medications and following transplant regimens was stressed.  The overall evaluation/approval/listing process was reviewed.        The patient was provided with the following documents:  What You Need to Know About a Kidney Transplant  Adult Kidney Transplant - A Guide for Patients  SRTR Data Sheet - Kidney  Brochure - Kidney Allocation  Brochure - Multiple Listing and Waiting Time Transfer  What Every Patient Needs to Know (UNOS)  UNOS Facts and Figures  Finding a Donor  [facility] - Quick Start Guide  KDPI Consent  Receipt of Information form    Elvin Guerrero signed the  Receipt of Information for Organ Transplant Recipient.\" He was provided Laquita Cyr's business card and instructed to call with " additional questions.      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 Pt is on home PD dialysis, started on 5/29/23. Pt is a type II diabetic, on 40-45 units of insulin/day.Hx:  diabetic nephropathy, bladder wall thickening, diabetic retinopathy, Pancreatitis (1990), dyslipidemia, hx of adenomatous colonic polyps, BPH with obstruction, acute diastolic HF, hyperparathyroidism, anemia.  Hx hypertension. Surgical hx: CABG x4 in 5/23, cholecystectomy and pancreatic cyst removal in 1990.  Pt is currently smoker a 50 year history on and off   Recently had a MOHS procedure for a SCC on his arm.      Candidacy category: Yellow    Action/Plan:  - Wife Anne-Marie and Elvin state they have NO computer access at home.  Anne-Marie goes to [employer] [facility] to read emails.    - Mr. Guerrero nor Anne-Marie have received the educational materials (Paper or email)  - THERON Iniguez will paste the weblinks to the [employer] Educational materials in this note.  Anne-Marie will attempt to watch them at the [employer].  I urged them to have their children who do have computers to assess them and watch at home.  Anne-Marie will plan for this to occur.  Once they have both watched the videos or had the class they will inform Laquita Cyr RN  - Urinalysis to be obtained today if red blood cell in urine he will need cystoscopy for assessment of bladder cancer risk due to 50+ yrs of smoking  -Local dermatologist to follow up with MOHS annually  -Pt will need to see Cardiologist for risk assessment.   -Dr. Ca requests the January 2024 CT scans to be obtain and the surgeon to review them for vessel patency.   - Pt signed the Willing to receive >85% KDPI form sent to be scanned and to Laquita Kim I/Geetha asked that the admin staff send out PAPER Educational material for kidney Transplant. They do not have computer at home.     Expected Selection Meeting Discussion: 3/20/202      [email or web address]          Transplant Medication Training (English): [email or web address]     Transplant Admission Training (English):   [email or web address]    Post-Transplant Complications (All Organs) (English): [email or web address]    Post-Transplant General Health (All Organs) (English): [email or web address]     General Patient Learning Modules (Enoxaparin, Warfarin, Schrader) (English): [email or web address]

## 2024-03-13 NOTE — LETTER
3/13/2024         RE: Elvin Guerrero  [address]  [place] [facility] [address]        Dear Colleague,    Thank you for referring your patient, Elvin Guerrero, to the [facility]. Please see a copy of my visit note below.    Psychosocial Assessment For Kidney  Transplantation  Patient Name/ Age: Elvin Guerrero 73 year old   Medical Record #: [id number]  Duration of Interview:     30 min  Process:   Face-to-Face Interview                (counseling < 50%)   Present at Appointment: Pt and Pt spouse         : [facility] Date:  March 13, 2024        Type of transplant: Kidney     Donor type:      Cadaver   Prior Transplants:    No Status of Transplant:           Current Living Situation    Location:   [address] [place] [id number]  With Whom: lives with their spouse       Family/ Social Support:    Pt and spouse Anne-Marie reside in [place], [place]. Pt has four children, one from a previous relationship. Pt children: Leeanne, Ade, Lilia and Pernell all reside in [facility]. Pt has several grandchildren. Pt has three sisters and one brother.     Pt spouse will be support person.  available, helpful   Committed Relationship:     Stable/Supportive   Other Supports:   Pt has a sister in law and brother in law. Pt has extended family and friends that are of support.  available, helpful       Activities/ Functional Ability    Current Level: cane/walker and independent with ADL's     Transportation drives self       Vocational/Employment/Financial     Employment   retired   Job Description  Pt is retired, Pt was previously a  for many years.       Income   SS detention   Insurance      At this time, patient can afford medication costs:  Yes  Medicare Advantage   Informed Pt that he will owe 20% of anti-rejection medications until he hits his OOPM.       Medical Status    Current Mode of Treatment for ESRD Dialysis   Complications None        Behavioral    Tobacco Use Yes  Chemical Dependency No   Pt states that he is a smoker, utilizing tobacco on a daily basis. SW explained that Pt will need to work on quitting smoking and may benefit from a smoking cessation program.   Pt denied any former history or current concerns with chemical health.   Psychiatric Impairment Yes   Pt denied any concerns. Pt spouse highlighted that Pt has symptoms of depression. Pt is not seeing a therapist or on medication. Pt feels that these symptoms are attributed to dialysis. SW encouraged finding an outpatient provider for additional support.     Reading Ability: Good  Education Level: Some College Recent Legal History No      Coping Style/Strategies: shooting, books       Ability to Adhere to Complex Medical Regime: Yes     Adherence History:  Pt reports full compliance in following medical routines.          Education  _X_ Medicare  _X_ Rehabilitation  _X_ Donor issues  _X_ Community resources  _X_ Post discharge housing  _X_ Financial resources  _X_ Medical insurance options  _X_ Psych adjustment  _X_ Family adjustment  _X_ Health Care Directive Provided Education   Psychosocial Risks of Transplant Reviewed and Discussed:  _X_ Increased stress related to emotional,            family, social, employment or financial           situation  _X_ Effect on work and/or disability benefits  _X_ Effect on future health and life           insurance  _X_ Transplant outcome expectations may           not be met  _X_ Mental Health Risks: anxiety,           depression, PTSD, guilt, grief and           chronic fatigue     Notable Items:   None noted.       Final Evaluation/Assessment   Patient seemed to process information well. Appeared well informed, motivated and able to follow post transplant requirements. Behavior was appropriate during interview. Has adequate income and insurance coverage. Adequate social support. No major contraindications noted for transplant.  At this time  patient appears to understand the risks and benefits of transplant.      Recommendation  Acceptable    Selection Criteria Met:  Plan for support Yes   Chemical Dependence Yes   Smoking Yes   Mental Health Yes   Adequate Finances Yes    Signature: DIOMEDES Stanford   Title: Clinical           Again, thank you for allowing me to participate in the care of your patient.        Sincerely,        DIOMEDES Stanford

## 2024-03-13 NOTE — LETTER
3/13/2024         RE: Elvin Guerrero  [address] [facility] [address]        Dear Colleague,    Thank you for referring your patient, Elvin Guerrero, to the [facility] TRANSPLANT [facility]. Please see a copy of my visit note below.    Transplant Surgery Consult Note    Medical record number: [id number]  YOB: 1950,   Consult requested by Dr. Jacobs for evaluation of kidney transplant candidacy.    Assessment and Recommendations: Mr. Guerrero is a fair candidate for transplantation and has a fair understanding of the risks and benefits of this approach to management of renal failure and diabetes. The following issues should be addressed prior to transplant:     Mr. Guerrero has End stage renal failure due to diabetes mellitus type 2 whose condition is not expected to resolve, is expected to progress, and is expected to continue to develop related comorbid conditions.  Cardiology consult for cardiac risk stratification to be ordered: Yes  CT abdomen and pelvis without contrast to be ordered for assessment of vascular targets: No  Transplant listing labs ordered to include HLA, ABOx2, Cr, etc.  Dietician consult ordered: Yes  Social work consult ordered: Yes  Imaging reports reviewed:  CT from 1/2024  IMPRESSION:   1. No hydronephrosis or obstructing calculus.   2. No noncontrast CT findings to explain reported symptoms; however, evaluation   is limited without intravenous contrast.   3. Status post cholecystectomy.   4. Peritoneal dialysis catheter in place with low-attenuation ascites   throughout the abdomen.   5. Otherwise as above.  Radiology images reviewed: Please obtain 1/2024 CT images for review  Recipient suitable to move forward with work up of living donors:  No  Clarify cancer wait time  Smoking cessation  obtain 1/2024 CT images for review  May need screening chest CT for smoking history.  Discussed risks of skin cancer      The majority of our visit was spent in counselling,  discussing the medical and surgical risks of living or  donor kidney and pancreas transplantation, either in a simultaneous or sequential fashion. I contrasted approximate wait time for SPK vs living vs  donor kidneys from normal (0-85%) or higher (%) kidney donor profile index (KDPI) donors and their associated outcomes. I would recommend this individual to consider kidneys from high KDPI donors. The reason for this decision is best summarized as: decreased dialysis related morbidity/mortality, accepting lower kidney graft survival rates.  Access to transplant will be impacted by living donor availability and overall candidacy for SPK, as well as the influence of blood type and degree of sensitization. We discussed advantages of preemptive transplant as well as living donor kidney transplant, and graft and patient survival outcomes associated with these options. Potential surgical complications of kidney and pancreas transplantation include bleeding, clotting, infection, wound complications, anastomotic failure and other issues such as cardiac complications, pneumonia, deep venous thrombosis, pulmonary embolism, post transplant diabetes and death. We discussed the need for protocol biopsy of the kidney and the possible need for a ureteral stent (and subsequent removal). We discussed benefits and risks associated with different approaches to exocrine drainage of pancreatic secretions. We also discussed differences in the average length of stay, recovery process, and posttransplant lab and monitoring protocol. We discussed the risk of graft rejection and recurrent diabetic nephropathy in the setting of poor glycemic control. I emphasized the need for strict immunosuppression adherence and the potential for complications of immunosuppression such as skin cancer or lymphoma, as well as a very low but not zero risk of donor-derived disease transmission risks (infection, cancer). Mr. Yolanda weston good  questions and the patient's candidacy will be reviewed at our Multidisciplinary Selection Committee. Thank you for the opportunity to participate in Mr. Guerrero's care.    Total time: 60 minutes        Luz Ca MD FACS  Associate Professor of Surgery  Director, Living Kidney Donor Program.  ---------------------------------------------------------------------------------------------------    HPI: Mr. Guerrero has End stage renal failure due to diabetes mellitus type 2. The patient has had diabetes for 30+ years. Management is by Humalog units  15u per day via ssi. Levemir 15u BID. The patient usually checks his blood sugar numerous times/day.  Daily blood glucoses range typically from 200 to 300.  Hypoglyemic unawareness is not an issue.  The diabetes is controlled.    Complications of diabetes include:    Retinopathy:  Yes   Neuropathy: Yes   Gastroparesis:  No    The patient is on dialysis.    Has potential kidney donors:  Yes .  Interested in participation in paired exchange if a donor is willing: Doesn't know     The patient has the following pertinent history:       No    Yes  Dialysis:    []      [x] via:    PD   Blood Transfusion                  [x]      []  Number of units:   Most recently:  Pregnancy:    [x]      [] Number:       Previous Transplant:  [x]      [] Details:    Cancer    []      [x] Comment: SCC 2.9cm 8/2023  Kidney stones   []      [x] Comment:   1980   Recurrent infections  [x]      []  Type:                  Bladder dysfunction  [x]      [] Cause:    Claudication   [x]      [] Distance:    Previous Amputation  [x]      [] Cause:     Chronic anticoagulation  []      [x] Indication: Plavix till 5/2024  Yazdanism  [x]      []     No past medical history on file.  No past surgical history on file.  No family history on file.  Social History     Socioeconomic History     Marital status:      Spouse name: Not on file     Number of children: Not on file     Years of  education: Not on file     Highest education level: Not on file   Occupational History     Not on file   Tobacco Use     Smoking status: Every Day     Types: Cigars     Smokeless tobacco: Never   Substance and Sexual Activity     Alcohol use: Yes     Comment: Rare     Drug use: Never     Sexual activity: Not on file   Other Topics Concern     Parent/sibling w/ CABG, MI or angioplasty before 65F 55M? No   Social History Narrative     Not on file     Social Determinants of Health     Financial Resource Strain: Not on file   Food Insecurity: Not on file   Transportation Needs: Not on file   Physical Activity: Not on file   Stress: Not on file   Social Connections: Not on file   Interpersonal Safety: Not on file   Housing Stability: Not on file       ROS:   CONSTITUTIONAL:  No fevers or chills  EYES: negative for icterus  ENT:  negative for hearing loss, tinnitus and sore throat  RESPIRATORY:  negative for cough, sputum, dyspnea  CARDIOVASCULAR:  negative for chest pain Fatigue  GASTROINTESTINAL:  negative for nausea, vomiting, diarrhea or constipation  GENITOURINARY:  negative for incontinence, dysuria, bladder emptying problems  HEME:  No easy bruising  INTEGUMENT:  negative for rash and pruritus  NEURO:  Negative for headache, seizure disorder    Allergies:   Allergies   Allergen Reactions     Iodinated Contrast Media Other (See Comments) and Hives       Medications:  Prescription Medications as of 3/13/2024         Rx Number Disp Refills Start End Last Dispensed Date Next Fill Date Owning Pharmacy    amLODIPine (NORVASC) 10 MG tablet  -- -- 12/26/2023 --       Sig: Take 10 mg by mouth daily    Class: Historical    Route: Oral    aspirin 81 MG EC tablet  -- --  --       Sig: Take 81 mg by mouth daily    Class: Historical    Route: Oral    atorvastatin (LIPITOR) 80 MG tablet  -- -- 5/28/2023 --       Sig: Take 80 mg by mouth    Class: Historical    Route: Oral    calcium acetate (CALPHRON) 667 MG TABS tablet  -- --  7/5/2023 --       Sig: Take 667 mg by mouth    Class: Historical    Route: Oral    carvedilol (COREG) 25 MG tablet  -- -- 6/2/2023 --       Sig: Take 25 mg by mouth    Class: Historical    Route: Oral    clopidogrel (PLAVIX) 75 MG tablet  -- -- 5/28/2023 --       Sig: Take 75 mg by mouth daily    Class: Historical    Route: Oral    furosemide (LASIX) 80 MG tablet  -- -- 5/28/2023 --       Sig: Take 80 mg by mouth daily    Class: Historical    Route: Oral    gentamicin (GARAMYCIN) 0.1 % external cream  -- -- 9/8/2023 --       Sig: Apply 1 Application topically    Class: Historical    Route: Topical    glucose 40 % (400 mg/mL) gel  -- -- 12/18/2023 --       Sig: Take 15 g by mouth    Class: Historical    Route: Oral    insulin detemir (LEVEMIR PEN) 100 UNIT/ML pen  -- -- 5/28/2023 --       Sig: 10 units two times a day    Class: Historical    insulin lispro (HUMALOG KWIKPEN) 100 UNIT/ML (1 unit dial) KWIKPEN  -- --  --       Sig: inject 5 units UNDER THE SKIN TWICE DAILY BEFORE meals plus sliding scale correction of 1 unit FOR every 50 points above 150    Class: Historical    lisinopril (ZESTRIL) 30 MG tablet  -- -- 1/30/2024 --       Sig: Take 30 mg by mouth at bedtime    Class: Historical    Route: Oral    Multiple Vitamin (QUINTABS) TABS  -- -- 5/28/2023 --       Sig: Take 1 tablet by mouth every morning    Class: Historical    Route: Oral    nitroGLYcerin (NITROSTAT) 0.4 MG sublingual tablet  -- -- 6/2/2023 --       Sig: Place 0.4 mg under the tongue    Class: Historical    Route: Sublingual            Exam:   Pulse:  [55] 55  BP: (137)/(72) 137/72  SpO2:  [96 %] 96 %  Appearance: in no apparent distress.   Skin: normal  Head and Neck: Normal, no rashes or jaundice  Respiratory: easy respirations, no audible wheezing.  Cardiovascular: RRR  Abdomen: rounded, protuberant, and distended, Surgical scars consistent with history and distended from PD dwell. Has PD catheter exiting RLQ. Has upper midline incision from  "pancreatic pseudocyst surgery   Extremities: femoral 1+/1+, Edema, none  Neuro: without deficit     Diagnostics:   No results found for this or any previous visit (from the past 672 hour(s)).  No results found for: \"CPRA\"       Again, thank you for allowing me to participate in the care of your patient.        Sincerely,        Luz Ca MD, MD    "

## 2024-03-13 NOTE — PROGRESS NOTES
Psychosocial Assessment For Kidney  Transplantation  Patient Name/ Age: Elvin Brown May 73 year old   Medical Record #: [id number]  Duration of Interview:     30 min  Process:   Face-to-Face Interview                (counseling < 50%)   Present at Appointment: Pt and Pt spouse         : DIOMEDES Stanford Date:  March 13, 2024        Type of transplant: Kidney     Donor type:      Cadaver   Prior Transplants:    No Status of Transplant:           Current Living Situation    Location:   [address] [place] [address]  With Whom: lives with their spouse       Family/ Social Support:    Pt and spouse Anne-Marie reside in [place], [place]. Pt has four children, one from a previous relationship. Pt children: Leeanne, Ade, Lilia and Pernell all reside in [facility] [place]. Pt has several grandchildren. Pt has three sisters and one brother.     Pt spouse will be support person.  available, helpful   Committed Relationship:     Stable/Supportive   Other Supports:   Pt has a sister in law and brother in law. Pt has extended family and friends that are of support.  available, helpful       Activities/ Functional Ability    Current Level: cane/walker and independent with ADL's     Transportation drives self       Vocational/Employment/Financial     Employment   retired   Job Description  Pt is retired, Pt was previously a  for many years.       Income   SS senior care   Insurance      At this time, patient can afford medication costs:  Yes  Medicare Advantage   Informed Pt that he will owe 20% of anti-rejection medications until he hits his OOPM.       Medical Status    Current Mode of Treatment for ESRD Dialysis   Complications None       Behavioral    Tobacco Use Yes  Chemical Dependency No   Pt states that he is a smoker, utilizing tobacco on a daily basis. SW explained that Pt will need to work on quitting smoking and may benefit from a smoking cessation program.   Pt denied any former  history or current concerns with chemical health.   Psychiatric Impairment Yes   Pt denied any concerns. Pt spouse highlighted that Pt has symptoms of depression. Pt is not seeing a therapist or on medication. Pt feels that these symptoms are attributed to dialysis. SW encouraged finding an outpatient provider for additional support.     Reading Ability: Good  Education Level: Some College Recent Legal History No      Coping Style/Strategies: shooting, books       Ability to Adhere to Complex Medical Regime: Yes     Adherence History:  Pt reports full compliance in following medical routines.          Education  _X_ Medicare  _X_ Rehabilitation  _X_ Donor issues  _X_ Community resources  _X_ Post discharge housing  _X_ Financial resources  _X_ Medical insurance options  _X_ Psych adjustment  _X_ Family adjustment  _X_ Health Care Directive Provided Education   Psychosocial Risks of Transplant Reviewed and Discussed:  _X_ Increased stress related to emotional,            family, social, employment or financial           situation  _X_ Effect on work and/or disability benefits  _X_ Effect on future health and life           insurance  _X_ Transplant outcome expectations may           not be met  _X_ Mental Health Risks: anxiety,           depression, PTSD, guilt, grief and           chronic fatigue     Notable Items:   None noted.       Final Evaluation/Assessment   Patient seemed to process information well. Appeared well informed, motivated and able to follow post transplant requirements. Behavior was appropriate during interview. Has adequate income and insurance coverage. Adequate social support. No major contraindications noted for transplant.  At this time patient appears to understand the risks and benefits of transplant.      Recommendation  Acceptable    Selection Criteria Met:  Plan for support Yes   Chemical Dependence Yes   Smoking Yes   Mental Health Yes   Adequate Finances Yes    Signature: Joselin Trinh  LICSW LIC   Title: Clinical
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
## 2024-03-14 LAB
ATRIAL RATE - MUSE: 54 BPM
CMV IGG SERPL IA-ACNC: <0.2 U/ML
CMV IGG SERPL IA-ACNC: NORMAL
DIASTOLIC BLOOD PRESSURE - MUSE: NORMAL MMHG
DRVVT SCREEN RATIO: 0.86
EBV VCA IGG SER IA-ACNC: 609 U/ML
EBV VCA IGG SER IA-ACNC: POSITIVE
GAMMA INTERFERON BACKGROUND BLD IA-ACNC: 0.01 IU/ML
HBV SURFACE AG SERPL QL IA: NONREACTIVE
INTERPRETATION ECG - MUSE: NORMAL
K AG RBC QL: NEGATIVE
LA PPP-IMP: NEGATIVE
LUPUS INTERPRETATION: NORMAL
M TB IFN-G BLD-IMP: NEGATIVE
M TB IFN-G CD4+ BCKGRND COR BLD-ACNC: 9.99 IU/ML
MITOGEN IGNF BCKGRD COR BLD-ACNC: 0 IU/ML
MITOGEN IGNF BCKGRD COR BLD-ACNC: 0 IU/ML
P AXIS - MUSE: 72 DEGREES
PR INTERVAL - MUSE: 200 MS
PTT RATIO: 0.97
QRS DURATION - MUSE: 112 MS
QT - MUSE: 502 MS
QTC - MUSE: 476 MS
QUANTIFERON MITOGEN: 10 IU/ML
QUANTIFERON NIL TUBE: 0.01 IU/ML
QUANTIFERON TB1 TUBE: 0.01 IU/ML
QUANTIFERON TB2 TUBE: 0.01
R AXIS - MUSE: -16 DEGREES
SPECIMEN EXPIRATION DATE: NORMAL
SYSTOLIC BLOOD PRESSURE - MUSE: NORMAL MMHG
T AXIS - MUSE: 153 DEGREES
THROMBIN TIME: 16.8 SECONDS (ref 13–19)
VENTRICULAR RATE- MUSE: 54 BPM
VZV IGG SER QL IA: 3902 INDEX
VZV IGG SER QL IA: POSITIVE

## 2024-03-18 LAB
A*: NORMAL
A*LOCUS SEROLOGIC EQUIVALENT: 2
A*LOCUS: NORMAL
A*SEROLOGIC EQUIVALENT: 68
ABTEST METHOD: NORMAL
B*: NORMAL
B*LOCUS SEROLOGIC EQUIVALENT: 37
B*LOCUS: NORMAL
B*SEROLOGIC EQUIVALENT: 60
BW-1: NORMAL
BW-2: NORMAL
C*: NORMAL
C*LOCUS SEROLOGIC EQUIVALENT: 10
C*LOCUS: NORMAL
C*SEROLOGIC EQUIVALENT: 6
CARDIOLIPIN IGG SER IA-ACNC: <2 GPL-U/ML
CARDIOLIPIN IGG SER IA-ACNC: NEGATIVE
CARDIOLIPIN IGM SER IA-ACNC: 2.5 MPL-U/ML
CARDIOLIPIN IGM SER IA-ACNC: NEGATIVE
DPA1*: NORMAL
DPB1*: NORMAL
DPB1*LOCUS NMDP: NORMAL
DPB1*LOCUS: NORMAL
DPB1*NMDP: NORMAL
DQA1*LOCUS: NORMAL
DQB1*: NORMAL
DQB1*LOCUS NMDP: NORMAL
DQB1*LOCUS SEROLOGIC EQUIVALENT: 6
DQB1*LOCUS: NORMAL
DQB1*NMDP: NORMAL
DQB1*SEROLOGIC EQUIVALENT: 6
DRB1*: NORMAL
DRB1*LOCUS SEROLOGIC EQUIVALENT: 13
DRB1*LOCUS: NORMAL
DRB1*SEROLOGIC EQUIVALENT: 15
DRB3*LOCUS SEROLOGIC EQUIVALENT: 52
DRB3*LOCUS: NORMAL
DRB5*: NORMAL
DRB5*SEROLOGIC EQUIVALENT: 51
DRSSO TEST METHOD: NORMAL

## 2024-03-20 ENCOUNTER — COMMITTEE REVIEW (OUTPATIENT)
Dept: TRANSPLANT | Facility: CLINIC | Age: 74
End: 2024-03-20
Payer: COMMERCIAL

## 2024-03-20 NOTE — COMMITTEE REVIEW
Abdominal Committee Review Note     Evaluation Date: 3/13/2024  Committee Review Date: 3/20/2024    Organ being evaluated for: Kidney    Transplant Phase: Evaluation  Transplant Status: Active    Transplant Coordinator: Laquita Cyr  Transplant Surgeon:  Dr. Marie Pollack     Referring Physician: Deepak Jacobs    Primary Diagnosis: Diabetes Mellitus type 2   Secondary Diagnosis:     Committee Review Members:  Nephrology Samir Costa MD, Richie Moyer, APRN CNP   Nutrition Yadira Kumar, RD   Pharmacist Leeanne Tierney, [facility]    - Clinical Delilah Laurence Zendejas, LICSW, Joselin Trinh, LICSW, Nathan Ledesma, MSW   Transplant GONSALO GRIFFIN, RN, Serenity Tesfaye, RN, Rosemarie Webber, RN, Joselin Hayes, RN, Bella Harrell, ROLF, Donya Wakefield, ROLF, Arlette Cody, ROLF, Marie Pollack MD   Transplant Surgery Marie Pollack MD       Transplant Eligibility: Irreversible chronic kidney disease treated w/dialysis or expected need for dialysis    Committee Review Decision: Needs Re-presentation    Relative Contraindications: None    Absolute Contraindications: None    Committee Chair Marie Pollack MD verbally attested to the committee's decision.    Committee Discussion Details: Reviewed pt's medical status and evaluation results to date with multidisciplinary committee. Due to patients history of  Invasive Cutaneous SCC, he will need a two year cancer free period dating to December of 2025. At that time he will need to re-update his evaluation with these recommendations and updated transplant visits.     Recommended the following evaluation items:    Cardiology: Will need a cardiac risk assessment   Urology: Urology Clearance due to macroscopic hematuria  Dermatology: Will need a two year wait time due to high risk of reoccurrence from December of 2023 to December of 2025.   Pulmonology: PFT's due to smoking history.   Imaging Recommended:  Review CT with surgery and obtain Aorto/Iliac US,  low dose chest CT, chest and abdominal CT due to history of thoracic and inguinal lymphadenopathy  Health Maintenance:  Update dental and PSA   My Transplant Place Class Review.    Patient should have live donors register now to initiate donor evaluation: Yes (committee recommended starting the live donor process now as he waits for cancer clearance time)    Committee determined that patient is a Fair candidate for Kidney     Listing plan: Will not list at this time as patient is on dialysis    Patient will be called and summary letter will be sent.

## 2024-03-22 ENCOUNTER — TELEPHONE (OUTPATIENT)
Dept: TRANSPLANT | Facility: CLINIC | Age: 74
End: 2024-03-22
Payer: COMMERCIAL

## 2024-03-22 LAB
PROTOCOL CUTOFF: NORMAL
SA 1 CELL: NORMAL
SA 1 TEST METHOD: NORMAL
SA 2 CELL: NORMAL
SA 2 TEST METHOD: NORMAL
SA1 HI RISK ABY: NORMAL
SA1 MOD RISK ABY: NORMAL
SA2 HI RISK ABY: NORMAL
SA2 MOD RISK ABY: NORMAL
UNACCEPTABLE ANTIGENS: NORMAL
UNOS CPRA: 53
ZZZSA 1  COMMENTS: NORMAL
ZZZSA 2 COMMENTS: NORMAL

## 2024-03-22 NOTE — TELEPHONE ENCOUNTER
Contacted patient to review outcome of selection committee meeting (See selection committee encounter).  Left a voicemail on patient's home phone to have him call back to discuss time frame of evaluation and wait time of invasive cutaneous SCC. Provided contact information and will send a mychart letter with details.

## 2024-03-22 NOTE — LETTER
03/22/24        Elvin Brown May  [address] [place] [address]        Dear Elvin,    It was a pleasure to see you recently for consideration of kidney transplantation. Your pre-transplant evaluation results were reviewed at our Multidisciplinary Selection Committee on 3/20/24. The committee is requesting the following items are completed before determining your candidacy:    You will need a two year wait time from 12/2023 from the time of your MOHS procedure due to your invaisve cutaneous SCC. You will not be able to be transplanted until after December of 2025. We do recommend you have your donor register to start that process.   You will need a repeat chest and abdominal CT May of 2024 due to history of of thoracic and inguinal lymphadenopathy. Please work with your PCP to schedule   You will need PFT's to be performed due to your smoking history.   You do need an updated PSA  You will need cardiology clearance and a cardiac risk assessment for transplant.     For any questions, please contact the Transplant Office at [phone].      Sincerely,      Solid Organ Transplant  [facility]

## 2024-03-26 ENCOUNTER — TELEPHONE (OUTPATIENT)
Dept: TRANSPLANT | Facility: CLINIC | Age: 74
End: 2024-03-26
Payer: COMMERCIAL

## 2024-03-26 NOTE — TELEPHONE ENCOUNTER
Talked with patient's wife Anne-Marie that I need to clarify time frame with committee prior to discussing next steps with transplant evaluation. I plan on clarifying with the committee tomorrow if we end his evaluation and he comes back in 2025 since he is on dialysis or do we start working on his evaluation now until he can be cleared from his SCC next December. She verbalized understanding and all were in good agreement with the plan.

## 2024-03-27 ENCOUNTER — COMMITTEE REVIEW (OUTPATIENT)
Dept: TRANSPLANT | Facility: CLINIC | Age: 74
End: 2024-03-27
Payer: COMMERCIAL

## 2024-03-27 NOTE — COMMITTEE REVIEW
Abdominal Committee Review Note     Evaluation Date: 3/13/2024  Committee Review Date: 3/27/2024    Organ being evaluated for: Kidney    Transplant Phase: Evaluation  Transplant Status: Active    Transplant Coordinator: Laquita Cyr  Transplant Surgeon: Dr. Abisai Samano      Referring Physician: Deepak Jacobs    Primary Diagnosis: Diabetes Mellitus Type 2  Secondary Diagnosis:     Committee Review Members:  Nephrology Samir Costa MD   Nutrition Yadira Kumar, RD   Pharmacist Leeanne Tierney, [facility]    - Clinical Delilah Laurence Zendejas, [facility], Joselin Trinh, [facility]   Transplant GONSALO GRIFFIN, RN, Laquita Cyr, RN, Serenity Tesfaye, RN, Ginna Suresh, RN, Abisai Samano MD, Rosemarie Webber, RN, Joselin Hayes, RN, Bella Portillo, LADI, Bella Harrell, RN, Xiomy Isabel, RN, Donya Wakefield, ROLF, Arlette Cody, RN       Transplant Eligibility: Irreversible chronic kidney disease treated w/dialysis or expected need for dialysis    Committee Review Decision: Declined    Relative Contraindications: None    Absolute Contraindications: None    Committee Chair Abisai Samano MD verbally attested to the committee's decision.    Committee Discussion Details: Reviewed pt's medical status and evaluation results to date with multidisciplinary committee. Reviewed time frame s/p MOHS for invasive cutaneous SCC, and waiting time through December 2025. The committee recommends that the patient ends his evaluation for now since he is on dialysis, and comes back at the 1.5 year lorelei after December of 2023 which would bring him to around June of 2025.     Listing plan: Declined due to:    Patient will be called and summary letter will be sent.

## 2024-03-28 ENCOUNTER — TELEPHONE (OUTPATIENT)
Dept: TRANSPLANT | Facility: CLINIC | Age: 74
End: 2024-03-28
Payer: COMMERCIAL

## 2024-03-28 NOTE — LETTER
March 28, 2024    Elvin Brown May  [address] [place] [address]      Dear Mr. Guerrero,   The purpose of this letter is to let you know that on 3/20/24 and 3/27/2024 the [facility]iplinary Selection Team reviewed the results of your transplant evaluation.  Based on the results of your evaluation and the selection criteria used by our program, the decision was made to not list you on the kidney transplant list.  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   Important things you should know:  If you would like to discuss the decision, or if your medical status changes you may schedule a return visits with your doctor by calling [phone] and asking to speak to your transplant coordinator.  We recommend that you continue to follow up with your primary care doctor in order to manage your health concerns.  We want you to know that our program has physician and surgeon coverage 24 hours a day, 365 days a year. In addition, our transplant surgeons and physicians will not be on call for two or more transplant programs more than 30 miles apart unless the circumstances have been reviewed and approved by the [employer] ([employer]) Membership and Professional Standards Committee (MPSC). Finally, our primary physician and primary surgeons are not designated as the primary surgeon or primary physician at more than 1 transplant hospital. If this coverage changes or there are substantial program changes, you will be notified in writing by letter.   Attached is a letter from [employer] that describes the services and information offered to patients by [employer] and the [employer] and Transplantation Network (OPTN)  Thank you for allowing us to participate in your care.  We  wish you well.  Sincerely,  ROLF Coelho       Solid Organ Transplant  [facility]    Enclosures: [employer] Letter  cc: Care Team    The [facility] Toll-free patient services line:  [phone]  Your resource for organ transplant information    Staffed 8:30 am - 5:00 pm ET Monday - Friday Leave a message 24/7 to receive a call back    The Organ Procurement and Transplantation Network (OPTN) is the national transplant system. It makes the policies that decide how donated organs are matched to patients waiting for a transplant. The OPTN:    Makes sure donated organs get matched to people on the transplant waiting list  Tells people about the donation and transplant processes  Makes sure that the public knows about the need for more organ and tissue donations    The OPTN has a free patient services line that you can call to:  Get more information about:  Organ donation and organ transplants  Donation and transplant policies  Get an information kit with:  A list of transplant hospitals  Waiting list information  Talk about any questions you may have about your transplant hospital or organ procurement organization. The staff will do their best to help you or point you to others who may help.  Find out how you can volunteer with the OPTN and help shape transplant policy     The patient services line number is: [phone]    Patient services line staff CANNOT answer questions about your own medical care, including:  Waiting list status  Test results  Medical records  You will need to call your transplant hospital for this information.    The following websites have more information about transplantation and donation:    OPTN: https://optn.transplant[facility].gov/    For potential living donors and transplant recipients:    Living with transplant: https://www.transplantliving.org/    Living donation process:  https://optn.transplant.hrsa.gov/living-donation/    Financial assistance: https://www.livingdonorassistance.org/    Transplantation data: https://www.srtr.org/    Organ donation: https://www.organdonor.gov/    Volunteer with the OPTN: https://optn.transplant.hrsa.gov/get-involved/